# Patient Record
(demographics unavailable — no encounter records)

---

## 2024-10-11 NOTE — HISTORY OF PRESENT ILLNESS
[FreeTextEntry1] : NIEVES CONLEY is a 81 year old F w/ pmhx of AS, HLD, HTN, DM, CAD CAC 60, Obesity who presents today for routine follow up. The patient denies fever, chills, sore throat, loss of taste or smell, muscle aches weight loss, malaise, rash, recent travel, insect bites, alteration bowel habits, headaches, weakness, abdominal pain, bloating, changes in urination, visual disturbances, shortness of breath, chest pain, dizziness, palpitations.  The patient is here for follow-up of elevated cholesterol. Patient is currently tolerating medication and denies muscle pain, joint pain, back pain, urinary changes , nausea, vomiting, abdominal pain or diarrhea. The patient is trying to follow a low cholesterol diet.  The patient here for evaluation of high blood pressure. Patient is currently tolerating the current antihypertensive regime and they deny headaches, stiff neck, visual changes, or PND. The patient has been trying to stay on a low-sodium diet.  The patient also presents for continued care of diabetes mellitus. Patient is following the diabetic regimen. Patient is tolerating hypoglycemic agents and following the diet. Patient denies any visual changes, blood in the urine, abdominal pain, nausea, vomiting, myalgias, arthralgias, paresthesias and syncope. The patient denies any chest discomfort, shortness of breath or new neurologic signs or symptoms. Patient denies any polyuria, worsening nocturia, or polydipsia.. The patient presents for  follow up of their coronary artery disease  CAC  and  continued medical management for risk reduction. The patient has been following all secondary prevents measures and antiplatelet therapy. The patient denies shortness of breath, chest pain, dizziness, palpitations, easy bruising/bleeding/hematuria/blood in stool. TTE from today 1. Left ventricular cavity is normal in size. Left ventricular wall thickness is mildly increased. Left ventricular systolic function is normal with an ejection fraction visually estimated at 60 %. 2. There is mild (grade 1) left ventricular diastolic dysfunction. 3. There is normal LV mass and concentric remodeling. 4. Left ventricular endocardium is not well visualized; however, the left ventricular systolic function appears grossly normal. 5. Left atrium is mildly dilated. 6. Estimated pulmonary artery systolic pressure is 32 mmHg, consistent with normal pulmonary artery pressure. 7. Trileaflet aortic valve with reduced systolic excursion. There is calcification of the aortic valve leaflets. Mild aortic stenosis. 8. Mild to moderate aortic regurgitation. 9. Mild pulmonic regurgitation. 10. Mild mitral regurgitation. 11. There is mild - moderate posterior calcification of the mitral valve annulus. 12. Mild to moderate tricuspid regurgitation. 13. Pericardial fat pad is seen anterior to the right ventricle. 14. No pericardial effusion seen. ____________________________________________________________________

## 2025-01-21 NOTE — HISTORY OF PRESENT ILLNESS
[FreeTextEntry1] : NIEVES CONLEY is a 81 year old F w/ pmhx of AS, HLD, HTN, DM, CAD CAC 60, Obesity who presents today for routine follow up. The patient denies fever, chills, sore throat, loss of taste or smell, muscle aches weight loss, malaise, rash, recent travel, insect bites, alteration bowel habits, headaches, weakness, abdominal pain, bloating, changes in urination, visual disturbances, shortness of breath, chest pain, dizziness, palpitations. Of note, Pt reportedly was treated recently for a UTI - completed a course of cefpodoxime.   The patient is here for follow-up of elevated cholesterol. Patient is currently tolerating medication and denies muscle pain, joint pain, back pain, urinary changes , nausea, vomiting, abdominal pain or diarrhea. The patient is trying to follow a low cholesterol diet.  The patient here for evaluation of high blood pressure. Patient is currently tolerating the current antihypertensive regime and they deny headaches, stiff neck, visual changes, or PND. The patient has been trying to stay on a low-sodium diet.  The patient also presents for continued care of diabetes mellitus. Patient is following the diabetic regimen. Patient is tolerating hypoglycemic agents and following the diet. Patient denies any visual changes, blood in the urine, abdominal pain, nausea, vomiting, myalgias, arthralgias, paresthesias and syncope. The patient denies any chest discomfort, shortness of breath or new neurologic signs or symptoms. Patient denies any polyuria, worsening nocturia, or polydipsia.  The patient presents today for evaluation of complaints of leg Edema. They admit to high dietary sodium intake recently. The patient denies any chest pain, shortness of breath, PND. Orthopnea, dizziness, nausea, vomiting, lightheadedness, abdominal pain, or fever.  The patient is here today complaining about pain in the lumbosacral spine area they state that the pain is on and off and occurs over the past several weeks increasing with frequency recently.  It is worse when they do certain movements with there back such as flexion hyperextension or rapid movements.  They deny any trauma swelling redness fever or chills history of gout, tic exposure or rash.

## 2025-02-25 NOTE — REVIEW OF SYSTEMS
[Patient Intake Form Reviewed] : Patient intake form was reviewed [Diarrhea: Grade 0] : Diarrhea: Grade 0 [Negative] : Neurological

## 2025-02-25 NOTE — ASSESSMENT
[FreeTextEntry1] : Ms. CONLEY 's questions were answered to her satisfaction.  She  expressed her  understanding and willingness to comply with the above recommendations, and  will return to the office in 6 months.

## 2025-02-25 NOTE — HISTORY OF PRESENT ILLNESS
[de-identified] : 81F, of [place] descent, PMHx aortic stenosis, prediabetes, HTN, spinal stenosis, LIZY on CPAP, returning for hematologic follow up of macrocytosis without anemia.   [FreeTextEntry1] : expectant surveillance  [de-identified] : Since her initial visit in August 2024 patient has been stable, reporting no changes in clinical status new symptoms.  Her lower back pain is unchanged and continues to receive acupuncture for spinal stenosis.  Pain is worse with hyperextension or flexion of the body or rapid movements.  Physical examination also unchanged from previous visit.  Denies hospitalizations, transfusions or growth factor support in the interim.  Continues on low-dose aspirin and gabapentin.  Had follow-up with cardiology month ago for elevated cholesterol and hypertension.  Medication adjusted; list reviewed and updated.  Hematologic profile showing normalization of MCV at 99.5 and no evidence of vitamin B12 or folate deficiency.

## 2025-03-17 NOTE — DISCUSSION/SUMMARY
[FreeTextEntry1] : #Recurrent UTI: We reviewed etiologies of recurrent UTI in menopausal woman and discussed options for preventative treatment including: adequate water intake, vaginal estrogen cream, D-Mannose (1g BID), PACs, daily prophylaxis with Methenamine (1g BID). We discussed antibiotic suppression as an alternative option as well if more conservative approaches fail. We discussed risks/benefits of the above treatments. We discussed instructions for vaginal estrogen use including loading dose of nightly for 2 weeks followed by twice weekly.   Plan: - Vaginal estrogen therapy - D-Mannose/PACs - Requested patient send records of urine culture results - RTO in 3 months

## 2025-03-17 NOTE — HISTORY OF PRESENT ILLNESS
[FreeTextEntry1] : Pt was seen back in 12/2023 for a then-resolved episode of UTI. She did not meet diagnostic criteria for recurrent UTIs at the time. Today, she presents reporting an increase in UTI frequency over the last few months. She went to  in 11/2024, 12/2024, and most recently 2 weeks ago (3/2025) with suprapubic pain radiating up the midline to the umbilicus as well as mild dysuria. Recent culture was positive for 50-100K E. coli. She has been treated with Cephalexin and reports resolution of her symptoms each time. She denies gross hematuria, vaginal discharge, itching, or vaginal burning sensation. She denies any changes in urinary frequency/urgency during episodes. She voids frequently at baseline due to diuretic use for LE edema. She has a 30 pack-year smoking history.

## 2025-03-27 NOTE — PHYSICAL EXAM
[Alert] : alert [Normal Voice/Communication] : normal voice/communication [Healthy Appearing] : healthy appearing [No Acute Distress] : no acute distress [Obese (BMI >= 30)] : obese (BMI >= 30) [Sclera] : the sclera and conjunctiva were normal [Hearing Threshold Finger Rub Not [place]] : hearing was normal [Normal Lips/Gums] : the lips and gums were normal [Oropharynx] : the oropharynx was normal [Normal Appearance] : the appearance of the neck was normal [No Neck Mass] : no neck mass was observed [No Respiratory Distress] : no respiratory distress [No Acc Muscle Use] : no accessory muscle use [Respiration, Rhythm And Depth] : normal respiratory rhythm and effort [Auscultation Breath Sounds / Voice Sounds] : lungs were clear to auscultation bilaterally [Heart Rate And Rhythm] : heart rate was normal and rhythm regular [Normal S1, S2] : normal S1 and S2 [Murmurs] : no murmurs [Bowel Sounds] : normal bowel sounds [Abdomen Tenderness] : non-tender [No Masses] : no abdominal mass palpated [Abdomen Soft] : soft [] : no hepatosplenomegaly [Oriented To Time, Place, And Person] : oriented to person, place, and time

## 2025-03-27 NOTE — ASSESSMENT
[FreeTextEntry1] : 80 yo female, hx of GERD, s/p UGI series, gastric empty study, cat scan abd pelvis, endoscopy last year, with severe gerd. NO help with previous omeprazole, but symptoms resolved with Dexilant. Previously under care of Robert Brunner, MD . Hx of essentail HTN, well controlled and HLD.   IMP:  1. gerd, controlled with Deslanzoprazole 2. HTN 3. HLD 4. obesity 5. fecal incontinence  Plan: 1 maintain Dexilant as discussed 2. Reviewed life-style changes 3. maintain anti htn 4. If fecal incontinence increases, pt to let me know and we will pursue.

## 2025-03-27 NOTE — HISTORY OF PRESENT ILLNESS
[FreeTextEntry1] : 2019 diverticulosis, neg bx [de-identified] : 2022 small hiatal hernia [de-identified] : 2022 normal [de-identified] : 2022 normal

## 2025-05-12 NOTE — PHYSICAL EXAM
[Well Developed] : well developed [Well Nourished] : well nourished [No Acute Distress] : no acute distress [Normal Conjunctiva] : normal conjunctiva [Normal Venous Pressure] : normal venous pressure [No Carotid Bruit] : no carotid bruit [Normal S1, S2] : normal S1, S2 [No Murmur] : no murmur [No Rub] : no rub [No Gallop] : no gallop [Clear Lung Fields] : clear lung fields [Good Air Entry] : good air entry [No Respiratory Distress] : no respiratory distress  [Soft] : abdomen soft [Non Tender] : non-tender [No Masses/organomegaly] : no masses/organomegaly [Normal Bowel Sounds] : normal bowel sounds [Normal Gait] : normal gait [No Edema] : no edema [No Cyanosis] : no cyanosis [No Clubbing] : no clubbing [No Varicosities] : no varicosities [No Rash] : no rash [No Skin Lesions] : no skin lesions [Moves all extremities] : moves all extremities [No Focal Deficits] : no focal deficits [Normal Speech] : normal speech [Alert and Oriented] : alert and oriented [Normal memory] : normal memory [de-identified] : Regular rate and rhythm, NL S1, S2, non-displaced PMI, chest non-tender; no rubs,heaves  or gallops a  Grade 2/6 systolic murmur noted at the LSB [de-identified] : pt with 2+ pitting edema to BLE

## 2025-05-12 NOTE — HISTORY OF PRESENT ILLNESS
[FreeTextEntry1] : NIEVES CONLEY is a 81 year old F w/ pmhx of AS, HLD, HTN, DM, CAD CAC 60, Obesity who presents today for routine follow up. The patient denies fever, chills, sore throat, loss of taste or smell, muscle aches weight loss, malaise, rash, recent travel, insect bites, alteration bowel habits, headaches, weakness, abdominal pain, bloating, changes in urination, visual disturbances, shortness of breath, chest pain, dizziness, palpitations.  The patient is here for follow-up of elevated cholesterol. Patient is currently tolerating medication and denies muscle pain, joint pain, back pain, urinary changes , nausea, vomiting, abdominal pain or diarrhea. The patient is trying to follow a low cholesterol diet.  The patient here for evaluation of high blood pressure. Patient is currently tolerating the current antihypertensive regime and they deny headaches, stiff neck, visual changes, or PND. The patient has been trying to stay on a low-sodium diet.  The patient also presents for continued care of diabetes mellitus. Patient is following the diabetic regimen. Patient is tolerating hypoglycemic agents and following the diet. Patient denies any visual changes, blood in the urine, abdominal pain, nausea, vomiting, myalgias, arthralgias, paresthesias and syncope. The patient denies any chest discomfort, shortness of breath or new neurologic signs or symptoms. Patient denies any polyuria, worsening nocturia, or polydipsia.  The patient presents today for evaluation of complaints of leg Edema. They admit to high dietary sodium intake recently. The patient denies any chest pain, shortness of breath, PND. Orthopnea, dizziness, nausea, vomiting, lightheadedness, abdominal pain, or fever.  The patient is here today complaining about pain in the lumbosacral spine area they state that the pain is on and off and occurs over the past several weeks increasing with frequency recently. It is worse when they do certain movements with there back such as flexion hyperextension or rapid movements. They deny any trauma swelling redness fever or chills history of gout, tic exposure or rash.

## 2025-05-12 NOTE — PHYSICAL EXAM
[Well Developed] : well developed [Well Nourished] : well nourished [No Acute Distress] : no acute distress [Normal Conjunctiva] : normal conjunctiva [Normal Venous Pressure] : normal venous pressure [No Carotid Bruit] : no carotid bruit [Normal S1, S2] : normal S1, S2 [No Murmur] : no murmur [No Rub] : no rub [No Gallop] : no gallop [Clear Lung Fields] : clear lung fields [Good Air Entry] : good air entry [No Respiratory Distress] : no respiratory distress  [Soft] : abdomen soft [Non Tender] : non-tender [No Masses/organomegaly] : no masses/organomegaly [Normal Bowel Sounds] : normal bowel sounds [Normal Gait] : normal gait [No Edema] : no edema [No Cyanosis] : no cyanosis [No Clubbing] : no clubbing [No Varicosities] : no varicosities [No Rash] : no rash [No Skin Lesions] : no skin lesions [Moves all extremities] : moves all extremities [No Focal Deficits] : no focal deficits [Normal Speech] : normal speech [Alert and Oriented] : alert and oriented [Normal memory] : normal memory [de-identified] : Regular rate and rhythm, NL S1, S2, non-displaced PMI, chest non-tender; no rubs,heaves  or gallops a  Grade 2/6 systolic murmur noted at the LSB [de-identified] : pt with 2+ pitting edema to BLE

## 2025-05-12 NOTE — HISTORY OF PRESENT ILLNESS
[FreeTextEntry1] : NEIVES CONLEY is a 81 year old F w/ pmhx of AS, HLD, HTN, DM, CAD CAC 60, Obesity who presents today for routine follow up. The patient denies fever, chills, sore throat, loss of taste or smell, muscle aches weight loss, malaise, rash, recent travel, insect bites, alteration bowel habits, headaches, weakness, abdominal pain, bloating, changes in urination, visual disturbances, shortness of breath, chest pain, dizziness, palpitations.  The patient is here for follow-up of elevated cholesterol. Patient is currently tolerating medication and denies muscle pain, joint pain, back pain, urinary changes , nausea, vomiting, abdominal pain or diarrhea. The patient is trying to follow a low cholesterol diet.  The patient here for evaluation of high blood pressure. Patient is currently tolerating the current antihypertensive regime and they deny headaches, stiff neck, visual changes, or PND. The patient has been trying to stay on a low-sodium diet.  The patient also presents for continued care of diabetes mellitus. Patient is following the diabetic regimen. Patient is tolerating hypoglycemic agents and following the diet. Patient denies any visual changes, blood in the urine, abdominal pain, nausea, vomiting, myalgias, arthralgias, paresthesias and syncope. The patient denies any chest discomfort, shortness of breath or new neurologic signs or symptoms. Patient denies any polyuria, worsening nocturia, or polydipsia.  The patient presents today for evaluation of complaints of leg Edema. They admit to high dietary sodium intake recently. The patient denies any chest pain, shortness of breath, PND. Orthopnea, dizziness, nausea, vomiting, lightheadedness, abdominal pain, or fever.  The patient is here today complaining about pain in the lumbosacral spine area they state that the pain is on and off and occurs over the past several weeks increasing with frequency recently. It is worse when they do certain movements with there back such as flexion hyperextension or rapid movements. They deny any trauma swelling redness fever or chills history of gout, tic exposure or rash.

## 2025-06-02 NOTE — DISCUSSION/SUMMARY
[FreeTextEntry1] : #Recurrent UTI:  - Continue vaginal estrogen therapy - Requested patient send records of urine culture results - RTO in 6 months

## 2025-06-02 NOTE — HISTORY OF PRESENT ILLNESS
[FreeTextEntry1] : 6/2/25: Has been doing well since last visit with vaginal estrogen therapy. Has not had any UTI symptoms since then. Admits to not taking PACs and D-mannose. Denies any complaints today.   3/17/25: Pt was seen back in 12/2023 for a then-resolved episode of UTI. She did not meet diagnostic criteria for recurrent UTIs at the time. Today, she presents reporting an increase in UTI frequency over the last few months. She went to  in 11/2024, 12/2024, and most recently 2 weeks ago (3/2025) with suprapubic pain radiating up the midline to the umbilicus as well as mild dysuria. Recent culture was positive for 50-100K E. coli. She has been treated with Cephalexin and reports resolution of her symptoms each time. She denies gross hematuria, vaginal discharge, itching, or vaginal burning sensation. She denies any changes in urinary frequency/urgency during episodes. She voids frequently at baseline due to diuretic use for LE edema. She has a 30 pack-year smoking history.

## 2025-07-15 NOTE — HISTORY OF PRESENT ILLNESS
[FreeTextEntry1] : 2019 diverticulosis, neg bx [de-identified] : 2022 small hiatal hernia [de-identified] : 2022 normal [de-identified] : 2022 normal

## 2025-07-15 NOTE — PHYSICAL EXAM
[Alert] : alert [Normal Voice/Communication] : normal voice/communication [Healthy Appearing] : healthy appearing [No Acute Distress] : no acute distress [Obese (BMI >= 30)] : obese (BMI >= 30) [Sclera] : the sclera and conjunctiva were normal [Hearing Threshold Finger Rub Not [place]] : hearing was normal [Normal Lips/Gums] : the lips and gums were normal [Oropharynx] : the oropharynx was normal [Normal Appearance] : the appearance of the neck was normal [No Neck Mass] : no neck mass was observed [No Respiratory Distress] : no respiratory distress [No Acc Muscle Use] : no accessory muscle use [Respiration, Rhythm And Depth] : normal respiratory rhythm and effort [Auscultation Breath Sounds / Voice Sounds] : lungs were clear to auscultation bilaterally [Heart Rate And Rhythm] : heart rate was normal and rhythm regular [Normal S1, S2] : normal S1 and S2 [Murmurs] : no murmurs [Bowel Sounds] : normal bowel sounds [Abdomen Tenderness] : non-tender [No Masses] : no abdominal mass palpated [Abdomen Soft] : soft [] : no hepatosplenomegaly [Oriented To Time, Place, And Person] : oriented to person, place, and time

## 2025-07-15 NOTE — ASSESSMENT
[FreeTextEntry1] : 81yo female, hx of GERD, s/p UGI series, gastric empty study, cat scan abd pelvis, endoscopy last year, with severe gerd. NO help with previous omeprazole, but symptoms resolved with Dexilant. Previously under care of Robert Brunner, MD . Hx of essentail HTN, well controlled and HLD.   RTO 3/27/25- weight stable at 192.  Was previously  ozempic, but states gave her abdominal pain Hx of macrocytic anemia-- sees Dr. rice. NO more gerd while on dexilant. Has frequent UTIS, seeing uro gynecology. Has had occasional fecal incontinence ,angelica with fatty foods.  RTo 7/15/25- unable to tolerate ozempic when pt took 2-3 years ago. Had n/v. Gained weight. No significant gerd. No sob or cp. IMP:  1. gerd, controlled with Deslanzoprazole 2. DM, unable to exercise ; diet not allowing pt to loose weight. 3. HLD 4. obesity 5. fecal incontinence  Plan: 1 maintain Dexilant as discussed 2. mounjaro 2.5 weekly for DM 3. RTO 4 week.

## 2025-07-17 NOTE — ASSESSMENT
[FreeTextEntry1] : 83yo female, hx of GERD, s/p UGI series, gastric empty study, cat scan abd pelvis, endoscopy last year, with severe gerd. NO help with previous omeprazole, but symptoms resolved with Dexilant. Previously under care of Robert Brunner, MD . Hx of essentail HTN, well controlled and HLD.   RTO 3/27/25- weight stable at 192.  Was previously  ozempic, but states gave her abdominal pain Hx of macrocytic anemia-- sees Dr. rice. NO more gerd while on dexilant. Has frequent UTIS, seeing uro gynecology. Has had occasional fecal incontinence ,angelica with fatty foods.  RTo 7/15/25- unable to tolerate ozempic when pt took 2-3 years ago. Had n/v. Gained weight. No significant gerd. No sob or cp.  RTO 7/17/25 for mounjaro instructions. Patient feeling well, no complaints. Reviewed written and verbal instructions, successfully administered 2.5 mg injection of Mounjaro. Will follow up as needed.   Plan: 1. Continue moujaro 2. Follow as needed.

## 2025-07-17 NOTE — HISTORY OF PRESENT ILLNESS
[FreeTextEntry1] : 2019 diverticulosis, neg bx [de-identified] : 2022 small hiatal hernia [de-identified] : 2022 normal [de-identified] : 2022 normal

## 2025-07-17 NOTE — PHYSICAL EXAM
[Alert] : alert [Normal Voice/Communication] : normal voice/communication [Healthy Appearing] : healthy appearing [No Acute Distress] : no acute distress [Obese (BMI >= 30)] : obese (BMI >= 30) [Sclera] : the sclera and conjunctiva were normal [Hearing Threshold Finger Rub Not [place]] : hearing was normal [Normal Lips/Gums] : the lips and gums were normal [Oropharynx] : the oropharynx was normal [Normal Appearance] : the appearance of the neck was normal [No Neck Mass] : no neck mass was observed [No Respiratory Distress] : no respiratory distress [No Acc Muscle Use] : no accessory muscle use [Respiration, Rhythm And Depth] : normal respiratory rhythm and effort [Auscultation Breath Sounds / Voice Sounds] : lungs were clear to auscultation bilaterally [Heart Rate And Rhythm] : heart rate was normal and rhythm regular [Normal S1, S2] : normal S1 and S2 [Murmurs] : no murmurs [Bowel Sounds] : normal bowel sounds [Abdomen Tenderness] : non-tender [No Masses] : no abdominal mass palpated [Abdomen Soft] : soft [] : no hepatosplenomegaly [Oriented To Time, Place, And Person] : oriented to person, place, and time